# Patient Record
(demographics unavailable — no encounter records)

---

## 2025-03-10 NOTE — HISTORY OF PRESENT ILLNESS
[FreeTextEntry1] : Mar 10, 2025    in person      PCP: Dr. Kemi Abebe             Gyn: Dr. Larry Mendelowitz.             .Ortho:  Dr. Liu at [facility]             CC: Nodular thyroid/Hashimoto's thyroiditis/Hypothyroid   (?elev TSI)             strongly positive TPO, Tg abs.     4/28/17  TSH 5.78;  4/19/18:  TSH 7.88            FNAB 3/2015 right mid and right lower pole nodules:: lymphocytic thyroiditis  at [facility]            FNAB 8/7/20  RUP nodule AUS (Kress I I I)  Interpace ThyGenNext/ThyraMIR both negative.            6/23/22:  US thyroid:  [facility] read by Dr. Carmen - nodular thyroiditis - stable c/w 2019.                 Hx ankle fracture R (during step aerobics)             (BMI sometimes dips below 10th percentile)              (Hx Lyme after camping in [place])              (2020: told Covid 19 ab+)  63 yo with positive TPO and Tg abs, Hashimoto's thyroiditis (nodular) and intermittent elevation of TSH at [place] (2017 and 2018) and now on levothyroxine - 25 mcg:  two tabs daily    On 12/12/2024 TSH 1.99  .   Underwent R and L sided thyroid nodule biopsies in 2015 at [facility] - both Kress 2. At [place] in 2020, one nodule read as 2.0 x 2.5 cm.(Kress 3)    US thyroid at [facility] compared to their 2019 US - very reassuring. - Dr. Carmen  US 6/5/23 at [facility]: 2.4 x 2.3 x 1.7 R sided nodule w/o change c/w 2019 and 2020.   May 1, 2024 US thyroid:   3.1 x 1.8 x 2.2 cm heterogeneous predominant isoechoic nodule in the mid to upper pole, not significantly changed (TIRAD-3   : : Constitutional:  Alert, well nourished, healthy appearance, no acute distress  Eyes:  No proptosis, no stare Thyroid: fullness R lobe,non-tender Pulmonary:  No respiratory distress, no accessory muscle use; normal rate and effort Cardiac:  Normal rate Vascular:  Endocrine:  No stigmata of Cushings Syndrome Musculoskeletal:  Normal gait, no involuntary movements Neurology:  No tremors Affect/Mood/Psych:  Oriented x 3; normal affect, normal insight/judgement, normal mood  .  Imp/Plan:  History of intermittant elevation of TSH as noted above, now on levothyroxine 25 mcg x 2 daily.   Feels well.   May have a degree of autonomous function and may lower LT4 in the future depending on TFT trajectory. Continued monitoring of thryoidnodules, particularly the dominant R thyroid TR3 nodule   ] Next US 4/2025, call me 10-14 days later. 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 Kemi Abebe             Gyn: Dr. Larry Mendelowitz.             .Ortho:  Dr. Liu at [facility]             CC: Nodular thyroid/Hashimoto's thyroiditis/Hypothyroid   (?elev TSI)             strongly positive TPO, Tg abs.     4/28/17  TSH 5.78;  4/19/18:  TSH 7.88            FNAB 3/2015 right mid and right lower pole nodules:: lymphocytic thyroiditis  at [facility]            FNAB 8/7/20  RUP nodule AUS (Kress I I I)  Interpace ThyGenNext/ThyraMIR both negative.            6/23/22:  US thyroid:  [facility] read by Dr. Carmen - nodular thyroiditis - stable c/w 2019.                 Hx ankle fracture R (during step aerobics)             (BMI sometimes dips below 10th percentile)              (Hx Lyme after camping in [place])              (2020: told Covid 19 ab+)  63 yo with positive TPO and Tg abs, Hashimoto's thyroiditis (nodular) and intermittent elevation of TSH at [place] (2017 and 2018) and now on levothyroxine - 25 mcg:  two tabs M, W, F and one tab other 4 days.  June 2022 TSH 2.28  .   Underwent R and L sided thyroid nodule biopsies in 2015 at [facility] - both Kress 2. At [place] in 2020, one nodule read as 2.0 x 2.5 cm.(Kress 3)    US thyroid at [facility] compared to their 2019 US - very reassuring. - Dr. Carmen  US 6/5/23 at [facility]: 2.4 x 2.3 x 1.7 R sided nodule w/o change c/w 2019 and 2020.    : : Constitutional:  Alert, well nourished, healthy appearance, no acute distress  Eyes:  No proptosis, no stare Thyroid:   Bilateral firm nodules Pulmonary:  No respiratory distress, no accessory muscle use; normal rate and effort Cardiac:  Normal rate Vascular:  Endocrine:  No stigmata of Cushings Syndrome Musculoskeletal:  Normal gait, no involuntary movements Neurology:  No tremors Affect/Mood/Psych:  Oriented x 3; normal affect, normal insight/judgement, normal mood  .  Imp:  Multinodular thyroid on levothyroxine 25 mcg x 2 tabs daily.  Dec 2023 TsH in good range. FNAB sreassuring thus far.  Plan:  US ~ May at [place] and call me a week later and ROV one year if stable.     Jun 20, 2023       had recent US thyroid.    PCP: Dr. Kemi Abebe             Gyn: Dr. Larry Mendelowitz.             .Ortho:  Dr. Liu at [facility]             CC: Nodular thyroid/Hashimoto's thyroiditis/Hypothyroid   (?elev TSI)             strongly positive TPO, Tg abs.     4/28/17  TSH 5.78;  4/19/18:  TSH 7.88            FNAB 3/2015 right mid and right lower pole nodules:: lymphocytic thyroiditis  at [facility]            FNAB 8/7/20  RUP nodule AUS (Kress I I I)  Interpace ThyGenNext/ThyraMIR both negative.            6/23/22:  US thyroid:  [facility] read by Dr. Carmen - nodular thyroiditis - stable c/w 2019.                 Hx ankle fracture R (during step aerobics)             (BMI sometimes dips below 10th percentile)              (Hx Lyme after camping in [place])              (2020: told Covid 19 ab+)  63 yo with positive TPO and Tg abs, Hashimoto's thyroiditis (nodular) and intermittent elevation of TSH at [place] (2017 and 2018) and now on levothyroxine - 25 mcg:  two tabs M, W, F and one tab other 4 days.  June 2022 TSH 2.28  .   Underwent R and L sided thyroid nodule biopsies in 2015 at [facility] - both Kress 2. At [place] in 2020, one nodule read as 2.0 x 2.5 cm.(Kress 3)    US thyroid at [facility] compared to their 2019 US - very reassuring. - Dr. Carmen  US 6/5/23 at [facility]: 2.4 x 2.3 x 1.7 R sided nodule w/o change c/w 2019 and 2020.   : : Constitutional:  Alert, well nourished, healthy appearance, no acute distress  Eyes:  No proptosis, no stare Thyroid:  sl fullness R lobe, non-tender Pulmonary:  No respiratory distress, no accessory muscle use; normal rate and effort Cardiac:  Normal rate Vascular:  Endocrine:  No stigmata of Cushing's Syndrome Musculoskeletal:  Normal gait, no involuntary movements Neurology:  No tremors Affect/Mood/Psych:  Oriented x 3; normal affect, normal insight/judgement, normal mood  . Imp:  Dominant R sided thyroid nodule.  Previous FNAB reassuring. Plan:  f/u US - next [facility].      Dec 06, 2022     in person  PCP: Dr. Kemi Abebe             Gyn: Dr. Larry Mendelowitz.             .Ortho:  Dr. Liu at [facility]             CC: Nodular thyroid/Hashimoto's thyroiditis/Hypothyroid   (?elev TSI)             strongly positive TPO, Tg abs.     4/28/17  TSH 5.78;  4/19/18:  TSH 7.88            FNAB 3/2015 right mid and right lower pole nodules:: lymphocytic thyroiditis  at [facility]            FNAB 8/7/20  RUP nodule AUS (Kress I I I)  Interpace ThyGenNext/ThyraMIR both negative.            6/23/22:  US thyroid:  [facility] read by Dr. Carmen - nodular thyroiditis - stable c/w 2019.                 Hx ankle fracture R (during step aerobics)             (BMI sometimes dips below 10th percentile)              (Hx Lyme after camping in [place])              (2020: told Covid 19 ab+)  63 yo with positive TPO and Tg abs, Hashimoto's thyroiditis (nodular) and intermittent elevation of TSH at [place] (2017 and 2018) and now on levothyroxine - 25 mcg:  two tabs M, W, F and one tab other 4 days.  June 2022 TSH 2.28  .   Underwent R and L sided thyroid nodule biopsies in 2015 at [facility] - both Kress 2. At [place] in 2020, one nodule read as 2.0 x 2.5 cm.(Kress 3)   Most recent  US thyroid at [facility] compared to their 2019 US - very reassuring. - Dr. Carmen  : : Constitutional:  Alert, well nourished, healthy appearance, no acute distress  Eyes:  No proptosis, no stare Thyroid: nodular Pulmonary:  No respiratory distress, no accessory muscle use; normal rate and effort Cardiac:  Normal rate Vascular:  Endocrine:  No stigmata of Cushing's Syndrome Musculoskeletal:  Normal gait, no involuntary movements Neurology:  No tremors Affect/Mood/Psych:  Oriented x 3; normal affect, normal insight/judgement, normal mood  .  Impression:  Prehypothyroid on low dose levothyroxine to minimize stimulation for nodular thyroid to grow. Most recent US thyroid at [facility] quite reassuring.  Plan:  US [place] in June, prior to next OV TFTs today     Jun 28, 2022     in person  PCP: Dr. Kemi Abeeb             Gyn: Dr. Larry Mendelowitz.             .Ortho:  Dr. Liu at [facility]             CC: Nodular thyroid/Hashimoto's thyroiditis/Hypothyroid   (?elev TSI)             strongly positive TPO, Tg abs.     4/28/17  TSH 5.78;  4/19/18:  TSH 7.88            FNAB 3/2015 right mid and right lower pole nodules:: lymphocytic thyroiditis  at [facility]            FNAB 8/7/20  RUP nodule AUS (Kress I I I)  Interpace ThyGenNext/ThyraMIR both negative.            6/23/22:  US thyroid:  [facility] read by Dr. Carmen - nodular thyroiditis - stable c/w 2019.                 Hx ankle fracture R (during step aerobics)             (BMI sometimes dips below 10th percentile)              (Hx Lyme after camping in [place])              (2020: told Covid 19 ab+)  Feels well.   Prior to last visit,  evaluated for flank pain.   Recent US thyroid at [facility] compared to their 2019 US - very reassuring. - Dr. Carmen  : : Constitutional:  Alert, well nourished, healthy appearance, no acute distress  Eyes:  No proptosis, no stare Thyroid:  Nodularity R and L; non-tender Pulmonary:  No respiratory distress, no accessory muscle use; normal rate and effort Cardiac:  Normal rate Vascular:  Endocrine:  No stigmata of Cushing's Syndrome Musculoskeletal:  Normal gait, no involuntary movements Neurology:  No tremors Affect/Mood/Psych:  Oriented x 3; normal affect, normal insight/judgement, normal mood  .  Imp:  Nodular thyroiditis. Remains on levothyroxine 25 mcg daily.   Recent reassuring US ROV six months.      Jan 11, 2022      in person            PCP: Dr. Kemi Abebe             Gyn: Dr. Larry Mendelowitz.             .Ortho:  Dr. Liu at [facility]             CC: Nodular thyroid/Hashimoto's thyroiditis/Hypothyroid   (?elev TSI)             strongly positive TPO, Tg abs.     4/28/17  TSH 5.78;  4/19/18:  TSH 7.88            FNAB 3/2015 right mid and right lower pole nodules:: lymphocytic thyroiditis  at [facility]            FNAB 8/7/20  RUP nodule AUS (Kress I I I)  Interpace ThyGenNext/ThyraMIR both negative.               Hx ankle fracture R (during step aerobics)             (BMI sometimes dips below 10th percentile)              (Hx Lyme after camping in [place])              (2020: told Covid 19 ab+)  Feels well.   Recently evaluated for flank pain.   Recent labs in good range at [facility] on 11/15/21 including TSH 2.91 (on LT4 25 mcg daily) 25 OH vitamin D 36.7 A1c 5.5 %  Impression:  Doing well.    Taking LT4 25 mcg daily.   Requires continuing surveillance of thyroid nodules.  Plan:  Repeat US thyroid at same facility prior to f/u visit here in June 2022.      Jul 20, 2021    in person                vaccinated after likely Covid   PCP: Dr. Medhat Mendoza             Gyn: Dr. Larry Mendelowitz.             .Ortho:  Dr. Liu at [facility]             CC: Nodular thyroid/Hashimoto's thyroiditis/Hypothyroid   (?elev TSI)             strongly positive TPO, Tg abs.               FNAB 3/2015 right mid and right lower pole nodules:: lymphocytic thyroiditis  at [facility]             Hx ankle fracture R (during step aerobics)             (BMI sometimes dips below 10th percentile)              (Hx Lyme after camping in [place])              (2020: told Covid 19 ab+)  62 yo visits b/o dominant thyroid nodule 2.3 x 1.7 x 2.15 cm .    8/7/20 at [facility] AUS,   Interpace/Thyramir:  very likely benign   Ultrasound performed at [facility], report pending and requested.   (again 7/23 to 515*) Her niece had thyroid cancer.    *6/2/2021 read by Dr. Carmen and compared to 2/19/2019:  R lobe 5.2 cm, L lobe 4.5 cm.  diffusely heterogeneous diffuse ill defined nodularity, a few sub-centimeter nodules, no suspicious lymph nodes....Impression:  Diffusely heterogeneous thyroid with ill defined nodularity and subjectively prominent nodularity....compatible with thyroiditis. ...no significant change."  : : Constitutional:  Alert, well nourished, healthy appearance, no acute distress  Eyes:  No proptosis, no stare Thyroid: Pulmonary:  No respiratory distress, no accessory muscle use; normal rate and effort Cardiac:  Normal rate Vascular:  Endocrine:  No stigmata of Cushing's Syndrome Musculoskeletal:  Normal gait, no involuntary movements Neurology:  No tremors Affect/Mood/Psych:  Oriented x 3; normal affect, normal insight/judgement, normal mood  .      Jan 27, 2021 in person   PCP: Dr. Medhat Mendoza             Gyn: Dr. Larry Mendelowitz.             .Ortho:  Dr. Liu at [facility]             CC: Nodular thyroid/Hashimoto's thyroiditis/Hypothyroid   (?elev TSI)             strongly positive TPO, Tg abs.               FNAB 3/2015 right mid and right lower pole nodules:: lymphocytic thyroiditis  at [facility]             Hx ankle fracture R (during step aerobics)             (BMI sometimes dips below 10th percentile)              (Hx Lyme after camping in [place])              (2020: told Covid 19 ab+)   8/7/20 FNAB of right thyroid nodule - AUS Kress Cat IIIi:  "Follicular cells, some with Hurthle cell change, showing mild atypia including mild nuclear enlargement, mild nuclear irregularity and few nuclear grooves." ThyGenNext "Nodule is very highly likely benign"      Imp:  FNAB at [facility]:  benign FNAB at [facility]: very likely benign  Taking levothyroxine 25 mcg x 2 tabs TIW  : : Constitutional:  Alert, well nourished, healthy appearance, no acute distress  Eyes:  No proptosis, no stare Thyroid: Pulmonary:  No respiratory distress, no accessory muscle use; normal rate and effort Cardiac:  Normal rate Vascular:  Endocrine:  No stigmata of Cushing's Syndrome Musculoskeletal:  Normal gait, no involuntary movements Neurology:  No tremors Affect/Mood/Psych:  Oriented x 3; normal affect, normal insight/judgement, normal mood  .  Impression:  Two reassuring FNAB of thyroid nodule.  Continued surveillance appropriate.  Plan:   Next US thyroid at [facility].    Jul 28, 2020     in  person   PCP: Dr. Medhat Mendoza             Gyn: Dr. Larry Mendelowitz.             .Ortho:  Dr. Liu at [facility]             CC: Nodular thyroid/Hashimoto's thyroiditis/Hypothyroid   (?elev TSI)             strongly positive TPO, Tg abs.               FNAB 3/2015 right mid and right lower pole nodules:: lymphocytic thyroiditis  at [facility]             Hx ankle fracture R (during step aerobics)             (BMI sometimes dips below 10th percentile)              (Hx Lyme after camping in [place])              (2020: told Covid 19 ab+)  Lab tests at [facility] November 2019 included: TSH 2.53                 Levothyroxine 25 mcg  July 27, 2020  US at [facility] of thyroid:   Thyroid ultrasound  CLINICAL HISTORY: Hashimoto's thyroiditis, thyroid nodule  COMPARISON: 11/20/2019  FINDINGS:  Thyroid isthmus measures 2.8 mm and is heterogeneous.  Right lobe of the thyroid is diffusely heterogeneous lobulated in contour measuring 4.3 x 1.7 x 2.3 cm with increased vascularity. A dominant mid to upper pole nodule is again seen measuring 2.3 x 1.7 x 2.2 cm.  Left lobe of thyroid measures 3.9 x 1.3 x 1.4 cm diffusely heterogeneous with an echogenic lower pole nodule measuring 0.82 x 0.61 x 0.62 cm.   IMPRESSION: Heterogeneous nodular thyroid gland consistent with nodular thyroiditis. Dominant right mid to upper pole nodule/asymmetry again seen and further evaluation if not previously performed is again suggested and may include sonogram guided needle aspiration biopsy  ***Electronically Signed *** ----------------------------------------------- Miguel Angel Hassan MD              07/28/20 Nov 05, 2019   PCP: Dr. Medhat Mendoza             Gyn: Dr. Larry Mendelowitz.             .Ortho:  Dr. Liu at [facility]             CC: Nodular thyroid/Hashimoto's thyroiditis/Hypothyroid   (?elev TSI)             strongly positive TPO, Tg abs.   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 Has Hashimoto's thyroiditis with nodules, early hypothyroidism, taking levothyroxine 25 mcg daily. She has had ultrasounds of the thyroid at different facilities including [facility], [facility], [place]. Most recent ultrasound at [facility] 2/19/19 and this was compared to [facility] 8/22/2018: "Right lobe 4.1 cm, L lobe 3.4 cm...no cervical lymphadenopathy...diffusely heterogeneous and course texture...discrete hypoechoic nodule right lobe inferiorly 0.9 x 0.8 x 0.6 cm, previously 0.9 x 0.7 x 0.6 cm...solid...follow up exam as warrented...." as read by Dr. Carmen.  Plan:  F/U US thyroid within about one year (she will chose facility) TFTs at [facility]    ROV 9 months.  Impression:  Findings are very reassuring.  She is feeling well.           March 5, 2019     PCP: Dr. Dennis Mccarthy             Gyn: Dr. Larry Mendelowitz.             .            CC: Nodular thyroid/Hashimoto's thyroiditis/Hypothyroid   (?elev TSI)             strongly positive TPO, Tg abs.   FNAB: lymphocytic thyroiditis             Hx ankle fracture R             (BMI sometimes dips below 10th percentile)              (Hx Lyme after camping in [place])  Visists for hypothyroid, MNG, underlying Hashimoto's.  Impression:  Stable.     Continue to monitor MNG by means of exam, ultrasound, labs.   ROV 6 months.            .    Previous notes from [employer] appended below.   Fall Risk/History of Falling No.         September 5, 2018            .            PCP: Dr. Dennis Mccarthy             Gyn: Dr. Larry Mendelowitz.             .            CC: Nodular thyroid/Hashimoto's thyroiditis/Hypothyroid             strongly positive TPO, Tg abs.              Hx ankle fracture R             (BMI sometimes dips below 10th percentile)            .            Went to [facility] for ultrasound thyroid.            Report adivsed repeat one year.            Feels well.             .            M, W, F takes LT4 25 mcg x 2 tabs daily            Th, Sat, Sun, Mon, takes 25.             .            Impression: Nodular Hashimoto's doing well on current levothyroxine            .            Vit D in good range. Good calcium intake.            .            Plan: Same Rx and ROV after labs in six months.             .            ==            .            July 6, 2018            .            PCP: Dr. Dennis Mccarthy            .            CC: Nodular thyroid/Hashimoto's thyroiditis/Hypothyroid            .            57 yo mother of twin 25 yo.            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            Visits today because of hypothyroidism and thyroid nodules.            .            Diagnosed with multinodular thyroid due to Hashimoto's thyroiditis.            .            About 1 1/2 year ago started on levothyroxine by Dr. Mccarthy 25 mcg tablets, most recently             two tabs x 3 days and one tab x 4 days.            .            7/13/17  (0-139) ***            TSH 3.61            vit D 34             4/28/17 TgAb 98            TSH 5.78            .            .            3/12/2015 [facility] FNAB R mid pole nodule "lymphocytic thyroiditis"            R lower pole thyroid nodule: "lymphocytic thyroiditis....six month follow up ultrasound recommended...." .            3/10/2017 [facility]7: R lobe 4.9 cm with 2 dominant nodules/pseudonodules. Mid pole isoechoic/hypoechoic nodule/pseudonodule with central and peripheral vascularity measuring 1.3 x 1.7 x 2.3 cm...stable since March 12, 2015. Within lower pole isoechoic nodule/pseudonodule with hypoechoic rim with perdominantly peripheral vascularity measuring 1.4 x 1.0 x 1.2 cm, stable since March 12, 2015.            .            5/16/2018 [facility]: R thyroid lobe 4.9 x 2.6 x 11.8 cm. A circumscribed oval solid nodule at the right amara 0.8 x 0.7 x 1.0 cm, previously 1.0 x 1.3 x 1.0 cm, decreased in size presently.            .            At the midpole a vague ill defined isoechoic solid 2.9 x 2.0 x 1.5 cm nodule, previousl 2.1 x 1.7 x 11.3 cm on 3/10/2017 (2.4 x 1.7 x 1.2 cm on 3/12/2015). The margins of this nodule are difficult to determine although it may have slightly increased in size. (both previously biopsied in 3/2015).            .            In the left lobe is a mildly hypoechoic solid nodule measuring 0.7 x 0.4 x 0.4 cm, not clearly identified on the prior exam however may represent a pseudonodule....can reassess in 6 months.            .            .            Impression: Patient has had very careful evaluation for early hypothyroidism and what is possibly nodular Hashimoto's thyroiditis. The initial biopsy results were very reassuring. Elevated TSI of interest. Occasionally occurs as a transient event. The possibility that one of the nodules may be enlarging is the main issue to be addressed at this time.             .            Plan: Will repeat labs and get follow up ultrasound before next visit in early September - this time at another facility. TSI will be repeated. Thank you. -thea Wong

## 2025-03-10 NOTE — PLAN
[FreeTextEntry1] : This is a 65-year-old female without any previous abdominal surgery with hypothyroid disease presents with an 8-year history of a lump in her upper abdomen just to the right of the midline.  In 2017 she had an ultrasound that suggested that this was a likely lipoma.  There was a 2 x 3 cm in size and was felt to be subcutaneous in nature above the muscle layer and deep to the skin.  Patient states that when she exercises like it slightly bigger and sore and she wishes to have it removed.  She denies any puncta.  She denies any drainage from fluid.  She denies any nausea vomiting or weight loss.  She also complains of a lump on the medial upper left thigh that causes her irritation as it rubs against her other thigh.  Review of systems: General-denies fatigue.  Cardiac- denies chest pain.  Respiratory- denies shortness of breath.  GI-denies nausea or vomiting.  -denies dysuria.  Musculoskeletal-denies back pain.  Psychiatric-denies hearing voices.  On limited exam of her abdomen it is soft and nontender.  She has something that appears to be a soft tissue mass above her umbilicus and slightly right to the midline.  It is approximately 3 to 4 cm in size.  It has the clinical consistency of a lipoma.  There is no overlying puncta.  There is no overlying skin changes.  It is not changed in size on palpation.  On her left inner thigh she has a soft tissue mass approximately 4 cm in size consistent with a lipoma.  It protrudes above skin level with some redundant skin.  Plan: Her main complaint is the upper abdominal soft tissue mass.  The ultrasound is consistent with this being a lipoma as a states that is superficial to the underlying muscle.  This location however is always concerning for a hernia and that this soft tissue mass could actually be a fat-containing hernia sac.  Despite the ultrasound saying is not the case it still a possibility.  Therefore have offered the patient abdominal wall expiration in the operating room and either removal of the soft tissue mass or repair of a hernia if 1 is found.  The repair was attempted to be done primarily but a mesh may be required.  We also discussed getting a CAT scan to look advance to see if this is a hernia.  The CAT scan though could easily miss a small defect and since the patient wishes to have this addressed I believe that is an unnecessary step.  Will plan for abdominal wall exploration with possible hernia repair versus soft tissue mass in the operating room under general anesthesia.  As far as the soft tissue mass of her left medial upper thigh this is likely a lipoma and can be done in the minor op procedure room at a different occasion.  She wishes to address the abdominal mass first and the thigh at a different time.  Risk of bleeding, infection, numbness, scar, recurrence, mesh complications have been explained to the patient and she is agreeable to the surgery.  In addition there are the usual medical risk associated with anesthesia including but not limited to cardiac, neurologic, pulmonary, and vascular complications including death.  Patient understands these risks and is agreeable to surgery.  Patient received Ancef prophylaxis.  She will see her primary care provider for optimization prior to the surgery.  She plans to go home the same day.  45 minutes of time was spent examining the patient, reviewing her chart, discussing surgical options, discussing risk and benefits of surgery, booking the surgery, and charting.

## 2025-03-26 NOTE — ASSESSMENT
[High Risk Surgery - Intraperitoneal, Intrathoracic or Supringuinal Vascular Procedures] : High Risk Surgery - Intraperitoneal, Intrathoracic or Supringuinal Vascular Procedures - No (0) [Ischemic Heart Disease] : Ischemic Heart Disease - No (0) [Congestive Heart Failure] : Congestive Heart Failure - No (0) [Prior Cerebrovascular Accident or TIA] : Prior Cerebrovascular Accident or TIA - No (0) [Creatinine >= 2mg/dL (1 Point)] : Creatinine >= 2mg/dL - No (0) [Insulin-dependent Diabetic (1 Point)] : Insulin-dependent Diabetic - No (0) [0] : 0 , RCRI Class: I, Risk of Post-Op Cardiac Complications: 3.9%, 95% CI for Risk Estimate: 2.8% - 5.4% [Patient Optimized for Surgery] : Patient optimized for surgery [No Further Testing Recommended] : no further testing recommended [Continue medications as is] : Continue current medications [As per surgery] : as per surgery [FreeTextEntry4] : Medical history and preoperative workup reviewed.  Patient is able to receive anesthesia and proceed with above planned procedure.

## 2025-03-26 NOTE — PHYSICAL EXAM
[No Edema] : there was no peripheral edema [Alert and Oriented x3] : oriented to person, place, and time [Normal] : affect was normal and insight and judgment were intact [Soft] : abdomen soft [Non Tender] : non-tender [de-identified] : supraumbilicus mass ~3 cm diameter nontender nonerythematous

## 2025-03-26 NOTE — PHYSICAL EXAM
[No Edema] : there was no peripheral edema [Alert and Oriented x3] : oriented to person, place, and time [Normal] : affect was normal and insight and judgment were intact [Soft] : abdomen soft [Non Tender] : non-tender [de-identified] : supraumbilicus mass ~3 cm diameter nontender nonerythematous

## 2025-03-26 NOTE — HISTORY OF PRESENT ILLNESS
[No Pertinent Cardiac History] : no history of aortic stenosis, atrial fibrillation, coronary artery disease, recent myocardial infarction, or implantable device/pacemaker [No Pertinent Pulmonary History] : no history of asthma, COPD, sleep apnea, or smoking [No Adverse Anesthesia Reaction] : no adverse anesthesia reaction in self or family member [(Patient denies any chest pain, claudication, dyspnea on exertion, orthopnea, palpitations or syncope)] : Patient denies any chest pain, claudication, dyspnea on exertion, orthopnea, palpitations or syncope [Good (7-10 METs)] : Good (7-10 METs) [Asthma] : asthma [Chronic Anticoagulation] : no chronic anticoagulation [Chronic Kidney Disease] : no chronic kidney disease [Diabetes] : no diabetes [FreeTextEntry1] : abdominal wall exploration with possible hernia repair vs soft tissue mass. [FreeTextEntry2] : 4/4/25 [FreeTextEntry3] : Dr. Feliz [FreeTextEntry4] : 65 year old female here for preoperative evaluation.  Scheduled for abdominal wall exploration with possible hernia repair vs soft tissue mass.  Procedure will be under general anesthesia.  Patient with chronic upper abdominal soft tissue mass likely lipoma with possibility of underling ventral wall hernia.   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  No allergy or other reaction to anesthesia.  [FreeTextEntry5] : exercise induced asthma

## 2025-04-21 NOTE — PLAN
[FreeTextEntry1] : Patient is 2 weeks status post repair of a ventral hernia with absorbable phasic's mesh.  The time of surgery were not sure if it was a soft tissue mass or hernia and surgery confirmed it was a hernia.  Patient feels well and has no complaints.  On exam her incisions healing well.  There appears to be dimpling of the skin to the left of the incision possibly from one of the mesh suture tacks.  There is no sign of infection.  There is no sign of recurrence.  Plan: Patient avoid heavy lifting for 1 additional month.  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  She will return in 3 months time and if the skin still appears to be dimpled we will consider numbing it and lifting it in the office.

## 2025-05-13 NOTE — PHYSICAL EXAM
[Normal] : alert, normal voice/communication, healthy appearing, no acute distress [Normal Sphincter Tone] : normal sphincter tone [No External Hemorrhoid] : no external hemorrhoids [No Rectal Mass] : no rectal mass [Occult Blood] : negative occult blood [Chaperone Present: ____] : chaperone present: [unfilled]

## 2025-05-13 NOTE — ASSESSMENT
[FreeTextEntry1] : patient is in good medical health she presents to discuss her gi symptoms, her bleeding which could even be hemorrhoidal she did have colonoscopy eight years ago  i am advising because of recent symptoms and the bleeding  that screening colonoscopy, be done at eight rather than waiting ten years.  AS WE OBTAIN INFORMED CONSENT FOR COLONOSCOPY, UPPER ENDOSCOPY [EGD], OR BOTH PROCEDURES TOGETHER;  As with all procedures, there are risks of which the patient should be aware  1.  Anesthesia; deep sedation with Propofol;  there is a small risk of aspiration and pulmonary infection.  The Anesthesiologist meets with the patient the morning of the procedure to discuss in more detail  2.  risk of bleeding; from removal of a polyp, or rarely, from biopsies, 1 % or less  3.  risk of injury or perforation of the colon or upper GI tract; one in a thousand or less,  from removing a polyp or from advancing the instrument  patient will be discussing with dr Feliz the best time for the colonoscopy, if he agreees, then earlier, otherwise we wait until later in the year if necessary...

## 2025-05-13 NOTE — CONSULT LETTER
[Dear  ___] : Dear ~ANA, [Consult Letter:] : I had the pleasure of evaluating your patient, [unfilled]. [Consult Closing:] : Thank you very much for allowing me to participate in the care of this patient.  If you have any questions, please do not hesitate to contact me. [FreeTextEntry2] : Dr Kemi Abebe [DrJudith  ___] : Dr. LINDSEY

## 2025-05-13 NOTE — PHYSICAL EXAM
[Awake] : awake [Alert] : alert [Thyroid Nodule] : thyroid nodule [Soft] : soft [Oriented x3] : oriented to person, place, and time [Vulvar Atrophy] : vulvar atrophy [Normal] : uterus [Atrophy] : atrophy [No Bleeding] : there was no active vaginal bleeding [Uterine Adnexae] : were not tender and not enlarged [Nl Sphincter Tone] : normal sphincter tone [Acute Distress] : no acute distress [Mass] : no breast mass [Nipple Discharge] : no nipple discharge [Axillary LAD] : no axillary lymphadenopathy [Tender] : non tender [Pap Obtained] : a Pap smear was not performed [Enlarged ___ wks] : not enlarged [Ovarian Mass (___ Cm)] : there were no adnexal masses [FreeTextEntry4] : <2 FB introitus [FreeTextEntry7] : axial to posterior; small [FreeTextEntry9] : no masses [FreeTextEntry2] : Britt Mccann. MOA

## 2025-05-13 NOTE — HISTORY OF PRESENT ILLNESS
[Patient reported mammogram was normal] : Patient reported mammogram was normal [Patient reported breast sonogram was normal] : Patient reported breast sonogram was normal [Patient reported PAP Smear was normal] : Patient reported PAP Smear was normal [Currently Active] : currently active [Men] : men [Vaginal] : vaginal [No] : No [FreeTextEntry1] : Last pap: 6/7/18 negative; HPV negative Last mammography: 5/23/19 negative Last screening breast ultrasound: 5/23/19 negative Last bone densitometry: 4/28/2017 T. -1.0 at the spine; hip -1.3; femoral neck -1.8 (Z. -0.6). Ten-year fracture risk 12 and 1.4% Last colonoscopy: 2017 neg Breast tissue density: Extremely dense  [TextBox_4] :  - 1994 - vag delivery of fraternal tiwns - male, female. Feels well; no PMB or other gyn complaints.  Normal bowel and bladder function. 2020 vitamin D 32.8. Has had Covid, flu & Shingrix vaccines (& booster). Did well w/ Estring but switched to estr crm for insurance reasons.  Bone Density: 3/4/21, T -1.5 at L1-2, hip -1.1; femoral neck -1.3. Osteopenia comparison is made to baseline in  and a most recent previous in . In the spine there are statistically significant decreases of 6.7 and 8% respectively. In the hip there are no statistically significant changes. Ten-year fracture risk: 12 and 1%. 21 thyroid function and vitamin D 35.7.  Colonoscopy: , Patient reported colonoscopy was normal, Dr. MAYNOR Alonzo. Orthopedic issues: Right ankle fracture in ~-twisted ankle during a step class. No other history of fracture. Also bilat carpal tunnel syndrome in the past (both repaired) and problems with left shoulder (steroid injections). Pt will contact Dr. MAYNOR Alonzo regarding next colonoscopy.  25: repair of ventral abdominal hernia with mesh-Dr. ALEXA Feliz.  Seeing Dr. Alonzo at 11:30 AM today regarding next colonoscopy.  No GYN complaints.  Resuming a walking routine and indoor bike.  Also, thyroid ultrasound later today. BD With comparison to 3//21.  T-score -1.4 at 1-2 representing an increase of 1.8% versus previous (Z score +0.3).  Hip -1.1 representing an increase of 2% versus previous.  Forearm -1.6 (Z-score -0.4).  Osteopenia with a 10-year fracture risk of 9.5 and 1.5%. The bone density report was discussed with the patient as were recent lab results. [Mammogramdate] : 5/1/2024 [TextBox_19] : Marbin lifetime risk: 14.2%.  Extremely dense tissue.  Breast arterial calcification: Grade 0.  No evidence of malignancy.  Previously heterogeneously dense.  Negative, Birads 2 [BreastSonogramDate] : 5/1/2024 [TextBox_25] : Negative, Birads 2 [PapSmeardate] : 3/7/2023 [TextBox_31] : Negative, HPV Negative [BoneDensityDate] : 5/12/25 [TextBox_37] : As above. 3/4/21-T -1.5 at L1-2, hip -1.1; femoral neck -1.3. Osteopenia comparison is made to baseline in 2014 and a most recent previous in 2017. In the spine there are statistically significant decreases of 6.7 and 8% respectively. In the hip there are no statistically significant changes. Ten-year fracture risk: 12 and 1%. 1/30/21 thyroid function and vitamin D 35.7. [ColonoscopyDate] : 2017 [TextBox_43] : Patient reported colonoscopy was normal, Dr. MAYNOR Alonzo

## 2025-07-16 NOTE — PLAN
[FreeTextEntry1] : Patient is 3-month status post repair of a ventral hernia with phasic's mesh.  She had some initial skin dimpling likely from one of the sutures in the mesh.  She is here to be evaluated to see if its improved and to see if it can be lifted.  She states that she gets some pain at that spot when she works out.  On exam she still has a dimpling approximately 2 cm off midline on the left.  The skin appears normal otherwise.  I explained to the patient that it is unclear if the dimpling is because of her pain which more likely is from a suture in the mesh pulling on the muscle.  Either event she wishes to attempt to lift the dimpling.  I I numbed the skin at that spot and lift with a [place] clamp freeing the skin from his underlying attachment.  The skin still naturally wants to sink back down as there is a gap underneath it but it is possible that this will filling with fluid and be straight.  Patient will follow in 2 weeks time to be reevaluated.  At the next step would be to make a small incision and cut any skin attachments to the underlying fat.  We will reevaluate in 2 weeks time.  Overall 30 minutes of time was spent evaluating the patient, discussing options, charting, and  modifying the scar.